# Patient Record
Sex: MALE | Race: WHITE | NOT HISPANIC OR LATINO | Employment: FULL TIME | URBAN - METROPOLITAN AREA
[De-identification: names, ages, dates, MRNs, and addresses within clinical notes are randomized per-mention and may not be internally consistent; named-entity substitution may affect disease eponyms.]

---
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
## 2023-03-01 PROBLEM — R13.10 DYSPHAGIA: Status: RESOLVED | Noted: 2019-05-21 | Resolved: 2023-03-01

## 2023-03-01 PROBLEM — K20.0 EOSINOPHILIC ESOPHAGITIS: Status: RESOLVED | Noted: 2019-09-30 | Resolved: 2023-03-01

## 2023-03-01 PROBLEM — R10.9 ABDOMINAL PAIN: Status: RESOLVED | Noted: 2021-08-06 | Resolved: 2023-03-01

## 2023-12-08 NOTE — DISCHARGE SUMMARY
Procedure: EGD  Diagnosis: esophagitis/gastritis  Condition: Tolerate procedure well. Discharged in Good Condition.  Meds: Continue current meds  Follow up: Call one week for biopsy results. Follow up 6 weeks.   none